# Patient Record
Sex: FEMALE | URBAN - NONMETROPOLITAN AREA
[De-identification: names, ages, dates, MRNs, and addresses within clinical notes are randomized per-mention and may not be internally consistent; named-entity substitution may affect disease eponyms.]

---

## 2020-07-06 ENCOUNTER — APPOINTMENT (RX ONLY)
Dept: URBAN - NONMETROPOLITAN AREA CLINIC 31 | Facility: CLINIC | Age: 20
Setting detail: DERMATOLOGY
End: 2020-07-06

## 2020-07-06 VITALS — TEMPERATURE: 98.4 F

## 2020-07-06 DIAGNOSIS — L81.4 OTHER MELANIN HYPERPIGMENTATION: ICD-10-CM

## 2020-07-06 DIAGNOSIS — D22 MELANOCYTIC NEVI: ICD-10-CM

## 2020-07-06 PROBLEM — D22.9 MELANOCYTIC NEVI, UNSPECIFIED: Status: ACTIVE | Noted: 2020-07-06

## 2020-07-06 PROCEDURE — ? ADDITIONAL NOTES

## 2020-07-06 PROCEDURE — 99202 OFFICE O/P NEW SF 15 MIN: CPT

## 2020-07-06 PROCEDURE — ? COUNSELING

## 2021-07-01 ENCOUNTER — APPOINTMENT (RX ONLY)
Dept: URBAN - NONMETROPOLITAN AREA CLINIC 35 | Facility: CLINIC | Age: 21
Setting detail: DERMATOLOGY
End: 2021-07-01

## 2021-07-01 DIAGNOSIS — D22 MELANOCYTIC NEVI: ICD-10-CM

## 2021-07-01 DIAGNOSIS — B07.8 OTHER VIRAL WARTS: ICD-10-CM

## 2021-07-01 DIAGNOSIS — L70.0 ACNE VULGARIS: ICD-10-CM

## 2021-07-01 DIAGNOSIS — Z12.83 ENCOUNTER FOR SCREENING FOR MALIGNANT NEOPLASM OF SKIN: ICD-10-CM

## 2021-07-01 PROBLEM — D22.72 MELANOCYTIC NEVI OF LEFT LOWER LIMB, INCLUDING HIP: Status: ACTIVE | Noted: 2021-07-01

## 2021-07-01 PROBLEM — D22.5 MELANOCYTIC NEVI OF TRUNK: Status: ACTIVE | Noted: 2021-07-01

## 2021-07-01 PROCEDURE — 17110 DESTRUCTION B9 LES UP TO 14: CPT

## 2021-07-01 PROCEDURE — ? LIQUID NITROGEN

## 2021-07-01 PROCEDURE — ? COUNSELING

## 2021-07-01 PROCEDURE — ? IN-HOUSE DISPENSING PHARMACY

## 2021-07-01 PROCEDURE — 99203 OFFICE O/P NEW LOW 30 MIN: CPT | Mod: 25

## 2021-07-01 ASSESSMENT — LOCATION DETAILED DESCRIPTION DERM
LOCATION DETAILED: RIGHT MEDIAL UPPER BACK
LOCATION DETAILED: SUPERIOR THORACIC SPINE
LOCATION DETAILED: LEFT MEDIAL FOREHEAD
LOCATION DETAILED: RIGHT LATERAL HEEL
LOCATION DETAILED: LEFT MEDIAL PLANTAR MIDFOOT
LOCATION DETAILED: LEFT LATERAL HEEL

## 2021-07-01 ASSESSMENT — LOCATION SIMPLE DESCRIPTION DERM
LOCATION SIMPLE: UPPER BACK
LOCATION SIMPLE: RIGHT FOOT
LOCATION SIMPLE: LEFT PLANTAR SURFACE
LOCATION SIMPLE: LEFT FOREHEAD
LOCATION SIMPLE: RIGHT UPPER BACK
LOCATION SIMPLE: LEFT FOOT

## 2021-07-01 ASSESSMENT — LOCATION ZONE DERM
LOCATION ZONE: FACE
LOCATION ZONE: TRUNK
LOCATION ZONE: FEET

## 2021-07-01 NOTE — PROCEDURE: IN-HOUSE DISPENSING PHARMACY
Product 80 Price/Unit (In Dollars): 0
Product 42 Unit Type: mg
Product 6 Unit Type: ml
Product 2 Amount/Unit (Numbers Only): 30
Name Of Product 4: Alopecia Topical Solution for Men\\nFinasteride 0.1%/Minoxidil 7%
Product 11 Price/Unit (In Dollars): 42
Send Charges To Patient Encounter: Yes
Product 4 Amount/Unit (Numbers Only): 60
Name Of Product 10: Desox 0.025% ointment\\nDesoximatasone 0.25%/Niacinamide 4%
Product 6 Price/Unit (In Dollars): 40
Product 4 Price/Unit (In Dollars): 45
Product 12 Amount/Unit (Numbers Only): 120
Product 9 Unit Type: grams
Name Of Product 1: Dap 6% Acne Gel\\nDapsone 6%/Niacinmide 4%
Name Of Product 21: ADAPALENE TRIPLE COMBINATION ACNE GEL  ADAPALENE.03% CLINDAMYCIN 1% BENZOYL PEROXIDE 2.5%
Product 21 Units Dispensed: 1
Name Of Product 8: Clob 0.05% Topical Solution \\nClobetasol Propionate 0.05%/Niacinamide 4%
Name Of Product 14: Hydrocort 2.5%/Keto 2% Fungal cream
Product 7 Price/Unit (In Dollars): 48
Name Of Product 18: Wart Gel\\nCimetidine 5%/Ibuprofen 2%/Salicylic Acid 17%
Name Of Product 12: Anti-fungal keto 2% shampoo\\nKetoconazole 2%/Zinc Pyrithione 1%/Salicylic Aci 2%
Name Of Product 15: Metron 1% Rosacea Gel \\nMetronidazole 1%/Niacinamide 4%
Product 2 Application Directions: AAA QD
Name Of Product 11: Mometasone 0.1% wHA Cream\\nHyaluronic Acid 2%/Mometasone Furoate 0.1%/Niacinamide 4%
Product 2 Price/Unit (In Dollars): 42.00
Product 21 Refills: 2
Product 13 Price/Unit (In Dollars): 53
Name Of Product 7: Anti-fungal Econaz 1% cream \\nEconazole Nitrate 1%/Niacinamide 4%
Name Of Product 20: Tret 0.05% w/ HA\\nHyaluronic Acid 0.5%/Niacinamide 4%/Tretinoin 0.05%
Name Of Product 6: Anti-fungal nail solution\\nCiclopirox 8%/Fluconazole 1%/Terbinafine 1%
Product 6 Amount/Unit (Numbers Only): 15
Name Of Product 2: metrogel 1%
Name Of Product 19: Bruise Healing Cream \\nArnica 2%/Ascorbic Acid 20%/Bromelain 5%/NIacinamide 4%/Phytoadione 1%
Product 21 Price/Unit (In Dollars): 45.00
Product 21 Application Directions: Apply pea sized amount to acne on face.
Name Of Product 13: Tacro 0.1% Ointment\\nNIacinamide 4%/Tacrolimus 0.1%
Name Of Product 17: Scar Silicone Gel \\nPentoxifylline 0.5%/Tranilast 1%/Triamcinolone Acetonide 0.1%/Zinc oxide 5%
Name Of Product 16: Ivermec 1%/Metron 1% Rosacea Gel
Detail Level: Zone
Name Of Product 9: Clob 0.05% ointment\\nClobetasol Propionate 0.05%/NIacinamide 4%
Name Of Product 5: Alopecia Topical Solution for Women\\nMinoxidil 7%/Progesterone 0.1%

## 2021-07-01 NOTE — HPI: PREVENTATIVE SKIN CHECK
What Is The Reason For Today's Visit?: Preventative Skin Check
Additional History: Pt has a few warts on her feet that she would like treated today. Pt’s last skin check was a year ago. Pt has had moles removed on her right wrist, abdomen and r upper thigh.

## 2021-07-01 NOTE — PROCEDURE: COUNSELING
Minocycline Pregnancy And Lactation Text: This medication is Pregnancy Category D and not consider safe during pregnancy. It is also excreted in breast milk.
Spironolactone Counseling: Patient advised regarding risks of diarrhea, abdominal pain, hyperkalemia, birth defects (for female patients), liver toxicity and renal toxicity. The patient may need blood work to monitor liver and kidney function and potassium levels while on therapy. The patient verbalized understanding of the proper use and possible adverse effects of spironolactone.  All of the patient's questions and concerns were addressed.
Spironolactone Pregnancy And Lactation Text: This medication can cause feminization of the male fetus and should be avoided during pregnancy. The active metabolite is also found in breast milk.
Topical Retinoid counseling:  Patient advised to apply a pea-sized amount only at bedtime and wait 30 minutes after washing their face before applying.  If too drying, patient may add a non-comedogenic moisturizer. The patient verbalized understanding of the proper use and possible adverse effects of retinoids.  All of the patient's questions and concerns were addressed.
Isotretinoin Counseling: Patient should get monthly blood tests, not donate blood, not drive at night if vision affected, not share medication, and not undergo elective surgery for 6 months after tx completed. Side effects reviewed, pt to contact office should one occur.
Doxycycline Pregnancy And Lactation Text: This medication is Pregnancy Category D and not consider safe during pregnancy. It is also excreted in breast milk but is considered safe for shorter treatment courses.
Use Enhanced Medication Counseling?: No
Erythromycin Counseling:  I discussed with the patient the risks of erythromycin including but not limited to GI upset, allergic reaction, drug rash, diarrhea, increase in liver enzymes, and yeast infections.
Tetracycline Counseling: Patient counseled regarding possible photosensitivity and increased risk for sunburn.  Patient instructed to avoid sunlight, if possible.  When exposed to sunlight, patients should wear protective clothing, sunglasses, and sunscreen.  The patient was instructed to call the office immediately if the following severe adverse effects occur:  hearing changes, easy bruising/bleeding, severe headache, or vision changes.  The patient verbalized understanding of the proper use and possible adverse effects of tetracycline.  All of the patient's questions and concerns were addressed. Patient understands to avoid pregnancy while on therapy due to potential birth defects.
Topical Retinoid Pregnancy And Lactation Text: This medication is Pregnancy Category C. It is unknown if this medication is excreted in breast milk.
High Dose Vitamin A Pregnancy And Lactation Text: High dose vitamin A therapy is contraindicated during pregnancy and breast feeding.
Birth Control Pills Counseling: Birth Control Pill Counseling: I discussed with the patient the potential side effects of OCPs including but not limited to increased risk of stroke, heart attack, thrombophlebitis, deep venous thrombosis, hepatic adenomas, breast changes, GI upset, headaches, and depression.  The patient verbalized understanding of the proper use and possible adverse effects of OCPs. All of the patient's questions and concerns were addressed.
Sarecycline Counseling: Patient advised regarding possible photosensitivity and discoloration of the teeth, skin, lips, tongue and gums.  Patient instructed to avoid sunlight, if possible.  When exposed to sunlight, patients should wear protective clothing, sunglasses, and sunscreen.  The patient was instructed to call the office immediately if the following severe adverse effects occur:  hearing changes, easy bruising/bleeding, severe headache, or vision changes.  The patient verbalized understanding of the proper use and possible adverse effects of sarecycline.  All of the patient's questions and concerns were addressed.
Bactrim Counseling:  I discussed with the patient the risks of sulfa antibiotics including but not limited to GI upset, allergic reaction, drug rash, diarrhea, dizziness, photosensitivity, and yeast infections.  Rarely, more serious reactions can occur including but not limited to aplastic anemia, agranulocytosis, methemoglobinemia, blood dyscrasias, liver or kidney failure, lung infiltrates or desquamative/blistering drug rashes.
High Dose Vitamin A Counseling: Side effects reviewed, pt to contact office should one occur.
Benzoyl Peroxide Pregnancy And Lactation Text: This medication is Pregnancy Category C. It is unknown if benzoyl peroxide is excreted in breast milk.
Birth Control Pills Pregnancy And Lactation Text: This medication should be avoided if pregnant and for the first 30 days post-partum.
Benzoyl Peroxide Counseling: Patient counseled that medicine may cause skin irritation and bleach clothing.  In the event of skin irritation, the patient was advised to reduce the amount of the drug applied or use it less frequently.   The patient verbalized understanding of the proper use and possible adverse effects of benzoyl peroxide.  All of the patient's questions and concerns were addressed.
Dapsone Pregnancy And Lactation Text: This medication is Pregnancy Category C and is not considered safe during pregnancy or breast feeding.
Azithromycin Pregnancy And Lactation Text: This medication is considered safe during pregnancy and is also secreted in breast milk.
Detail Level: Detailed
Azithromycin Counseling:  I discussed with the patient the risks of azithromycin including but not limited to GI upset, allergic reaction, drug rash, diarrhea, and yeast infections.
Topical Clindamycin Counseling: Patient counseled that this medication may cause skin irritation or allergic reactions.  In the event of skin irritation, the patient was advised to reduce the amount of the drug applied or use it less frequently.   The patient verbalized understanding of the proper use and possible adverse effects of clindamycin.  All of the patient's questions and concerns were addressed.
Bactrim Pregnancy And Lactation Text: This medication is Pregnancy Category D and is known to cause fetal risk.  It is also excreted in breast milk.
Tazorac Pregnancy And Lactation Text: This medication is not safe during pregnancy. It is unknown if this medication is excreted in breast milk.
Topical Clindamycin Pregnancy And Lactation Text: This medication is Pregnancy Category B and is considered safe during pregnancy. It is unknown if it is excreted in breast milk.
Detail Level: Generalized
Doxycycline Counseling:  Patient counseled regarding possible photosensitivity and increased risk for sunburn.  Patient instructed to avoid sunlight, if possible.  When exposed to sunlight, patients should wear protective clothing, sunglasses, and sunscreen.  The patient was instructed to call the office immediately if the following severe adverse effects occur:  hearing changes, easy bruising/bleeding, severe headache, or vision changes.  The patient verbalized understanding of the proper use and possible adverse effects of doxycycline.  All of the patient's questions and concerns were addressed.
Isotretinoin Pregnancy And Lactation Text: This medication is Pregnancy Category X and is considered extremely dangerous during pregnancy. It is unknown if it is excreted in breast milk.
Topical Sulfur Applications Counseling: Topical Sulfur Counseling: Patient counseled that this medication may cause skin irritation or allergic reactions.  In the event of skin irritation, the patient was advised to reduce the amount of the drug applied or use it less frequently.   The patient verbalized understanding of the proper use and possible adverse effects of topical sulfur application.  All of the patient's questions and concerns were addressed.
Detail Level: Simple
Erythromycin Pregnancy And Lactation Text: This medication is Pregnancy Category B and is considered safe during pregnancy. It is also excreted in breast milk.
Topical Sulfur Applications Pregnancy And Lactation Text: This medication is Pregnancy Category C and has an unknown safety profile during pregnancy. It is unknown if this topical medication is excreted in breast milk.
Dapsone Counseling: I discussed with the patient the risks of dapsone including but not limited to hemolytic anemia, agranulocytosis, rashes, methemoglobinemia, kidney failure, peripheral neuropathy, headaches, GI upset, and liver toxicity.  Patients who start dapsone require monitoring including baseline LFTs and weekly CBCs for the first month, then every month thereafter.  The patient verbalized understanding of the proper use and possible adverse effects of dapsone.  All of the patient's questions and concerns were addressed.
Tazorac Counseling:  Patient advised that medication is irritating and drying.  Patient may need to apply sparingly and wash off after an hour before eventually leaving it on overnight.  The patient verbalized understanding of the proper use and possible adverse effects of tazorac.  All of the patient's questions and concerns were addressed.
Minocycline Counseling: Patient advised regarding possible photosensitivity and discoloration of the teeth, skin, lips, tongue and gums.  Patient instructed to avoid sunlight, if possible.  When exposed to sunlight, patients should wear protective clothing, sunglasses, and sunscreen.  The patient was instructed to call the office immediately if the following severe adverse effects occur:  hearing changes, easy bruising/bleeding, severe headache, or vision changes.  The patient verbalized understanding of the proper use and possible adverse effects of minocycline.  All of the patient's questions and concerns were addressed.

## 2021-07-01 NOTE — PROCEDURE: LIQUID NITROGEN
Include Z78.9 (Other Specified Conditions Influencing Health Status) As An Associated Diagnosis?: No
Post-Care Instructions: I reviewed with the patient in detail post-care instructions. Patient is to wear sunprotection, and avoid picking at any of the treated lesions. Pt may apply Vaseline to crusted or scabbing areas.
Medical Necessity Information: It is in your best interest to select a reason for this procedure from the list below. All of these items fulfill various CMS LCD requirements except the new and changing color options.
Consent: The patient's consent was obtained including but not limited to risks of crusting, scabbing, blistering, scarring, darker or lighter pigmentary change, recurrence, incomplete removal and infection.
Medical Necessity Clause: This procedure was medically necessary because the lesions that were treated were: Itchy, irritated, painful when rubbing on clothing
Detail Level: Detailed

## 2025-07-11 ENCOUNTER — APPOINTMENT (OUTPATIENT)
Dept: URBAN - NONMETROPOLITAN AREA CLINIC 34 | Facility: CLINIC | Age: 25
Setting detail: DERMATOLOGY
End: 2025-07-11

## 2025-07-11 DIAGNOSIS — D22 MELANOCYTIC NEVI: ICD-10-CM

## 2025-07-11 DIAGNOSIS — Z12.83 ENCOUNTER FOR SCREENING FOR MALIGNANT NEOPLASM OF SKIN: ICD-10-CM

## 2025-07-11 DIAGNOSIS — B07.0 PLANTAR WART: ICD-10-CM

## 2025-07-11 PROBLEM — D22.39 MELANOCYTIC NEVI OF OTHER PARTS OF FACE: Status: ACTIVE | Noted: 2025-07-11

## 2025-07-11 PROBLEM — D22.5 MELANOCYTIC NEVI OF TRUNK: Status: ACTIVE | Noted: 2025-07-11

## 2025-07-11 PROCEDURE — ? LIQUID NITROGEN

## 2025-07-11 PROCEDURE — ? PHOTO-DOCUMENTATION

## 2025-07-11 PROCEDURE — ? COUNSELING

## 2025-07-11 PROCEDURE — ? OBSERVATION

## 2025-07-11 ASSESSMENT — LOCATION DETAILED DESCRIPTION DERM
LOCATION DETAILED: RIGHT FOREHEAD
LOCATION DETAILED: RIGHT LATERAL PLANTAR HEEL
LOCATION DETAILED: RIGHT MEDIAL PLANTAR HEEL
LOCATION DETAILED: SUPERIOR THORACIC SPINE
LOCATION DETAILED: SUBXIPHOID
LOCATION DETAILED: RIGHT MEDIAL BREAST 12-1:00 REGION

## 2025-07-11 ASSESSMENT — LOCATION SIMPLE DESCRIPTION DERM
LOCATION SIMPLE: RIGHT BREAST
LOCATION SIMPLE: UPPER BACK
LOCATION SIMPLE: RIGHT FOREHEAD
LOCATION SIMPLE: RIGHT PLANTAR SURFACE
LOCATION SIMPLE: ABDOMEN

## 2025-07-11 ASSESSMENT — LOCATION ZONE DERM
LOCATION ZONE: FACE
LOCATION ZONE: FEET
LOCATION ZONE: TRUNK

## 2025-07-11 NOTE — HPI: PREVENTATIVE SKIN CHECK
What Is The Reason For Today's Visit?: Full Body Skin Examination
Additional History: Spot of concern: r plantar foot

## 2025-07-11 NOTE — PROCEDURE: LIQUID NITROGEN
Render Post-Care Instructions In Note?: no
Show Aperture Variable?: Yes
Medical Necessity Clause: This procedure was medically necessary because the lesions that were treated were:
Number Of Freeze-Thaw Cycles: 3 freeze-thaw cycles
Detail Level: Detailed
Medical Necessity Information: It is in your best interest to select a reason for this procedure from the list below. All of these items fulfill various CMS LCD requirements except the new and changing color options.
Spray Paint Text: The liquid nitrogen was applied to the skin utilizing a spray paint frosting technique.
Post-Care Instructions: I reviewed with the patient in detail post-care instructions. Patient is to wear sunprotection, and avoid picking at any of the treated lesions. Pt may apply Vaseline to crusted or scabbing areas.
Consent: The patient's consent was obtained including but not limited to risks of crusting, scabbing, blistering, scarring, darker or lighter pigmentary change, recurrence, incomplete removal and infection.